# Patient Record
Sex: FEMALE | Race: WHITE | NOT HISPANIC OR LATINO | Employment: FULL TIME | URBAN - METROPOLITAN AREA
[De-identification: names, ages, dates, MRNs, and addresses within clinical notes are randomized per-mention and may not be internally consistent; named-entity substitution may affect disease eponyms.]

---

## 2024-08-24 ENCOUNTER — OFFICE VISIT (OUTPATIENT)
Dept: URGENT CARE | Facility: CLINIC | Age: 29
End: 2024-08-24
Payer: COMMERCIAL

## 2024-08-24 VITALS
WEIGHT: 177 LBS | BODY MASS INDEX: 33.42 KG/M2 | SYSTOLIC BLOOD PRESSURE: 149 MMHG | RESPIRATION RATE: 20 BRPM | DIASTOLIC BLOOD PRESSURE: 93 MMHG | OXYGEN SATURATION: 99 % | TEMPERATURE: 99 F | HEIGHT: 61 IN

## 2024-08-24 DIAGNOSIS — L25.5 PLANT DERMATITIS: Primary | ICD-10-CM

## 2024-08-24 PROCEDURE — 99203 OFFICE O/P NEW LOW 30 MIN: CPT | Mod: ,,, | Performed by: FAMILY MEDICINE

## 2024-08-24 RX ORDER — PREDNISONE 10 MG/1
30 TABLET ORAL DAILY
Qty: 15 TABLET | Refills: 0 | Status: SHIPPED | OUTPATIENT
Start: 2024-08-24 | End: 2024-08-29

## 2024-08-24 RX ORDER — CLOBETASOL PROPIONATE 0.5 MG/G
CREAM TOPICAL 2 TIMES DAILY
Qty: 30 G | Refills: 0 | Status: SHIPPED | OUTPATIENT
Start: 2024-08-24 | End: 2024-08-31

## 2024-08-24 NOTE — PATIENT INSTRUCTIONS
Continue: Travatan Z (travoprost): drops: 0.004% 1 drop at bedtime as directed into both eyes 12- Medication sent to pharmacy take as prescribed.   Use a cold wet washcloth to affected areas to reduce itching   Take either warm or cool bath with oatmeal products such as Aveeno for skin soothing   Avoid direct sunlight   Leave the rash open to air as much as possible   Wash all clothing that may have come in contact with the plan oil   Calamine lotion may help relieve symptoms of the rash and itch   Call or seek immediate medical attention if your rash gets worse, fever, stiff neck, nausea or vomiting, if you have increased pain, swelling, warmth, red streaks, pus draining from the rash, or if you have any new or worrisome symptoms that arise at home or if you do not get better as expected.

## 2024-08-24 NOTE — PROGRESS NOTES
"Subjective:      Patient ID: Emily Nicole Fromenthal is a 28 y.o. female.    Vitals:  height is 5' 1.42" (1.56 m) and weight is 80.3 kg (177 lb). Her oral temperature is 98.7 °F (37.1 °C). Her blood pressure is 149/93 (abnormal). Her respiration is 20 and oxygen saturation is 99%.     Chief Complaint: Rash    Patient is a 28 y.o. female who presents to urgent care with complaints of rash, itching  (small blisters , slow to heal x1.5 months. Located on both ankles and both arms. Alleviating factors include Eucerin location with mild amount of relief.  She is a  and says she may have come into contact with plants.  Denies fever malaise, drainage, or pain.           Constitution: Negative for chills, sweating, fatigue and fever.   HENT: Negative.     Neck: neck negative.   Cardiovascular: Negative.    Eyes: Negative.    Respiratory: Negative.     Gastrointestinal: Negative.    Endocrine: negative.   Genitourinary: Negative.    Musculoskeletal: Negative.    Skin:  Positive for rash. Negative for erythema.   Allergic/Immunologic: Negative.       Objective:     Physical Exam   Constitutional: She is oriented to person, place, and time. She appears well-developed.   HENT:   Head: Normocephalic and atraumatic. Head is without abrasion, without contusion and without laceration.   Ears:   Right Ear: External ear normal.   Left Ear: External ear normal.   Nose: Nose normal.   Mouth/Throat: Oropharynx is clear and moist and mucous membranes are normal.   Eyes: Conjunctivae, EOM and lids are normal. Pupils are equal, round, and reactive to light.   Neck: Trachea normal and phonation normal. Neck supple.   Cardiovascular: Normal rate, regular rhythm and normal heart sounds.   Pulmonary/Chest: Effort normal and breath sounds normal. No stridor. No respiratory distress.   Musculoskeletal: Normal range of motion.         General: Normal range of motion.   Neurological: She is alert and oriented to person, place, and time. " "  Skin: Skin is warm, dry, intact and rash. Capillary refill takes less than 2 seconds. No abrasion, No burn, No bruising, No erythema and No ecchymosis         Comments: Multiple small round scabs and vesicles in multiple stages of healing to the right and left forearms   Psychiatric: Her speech is normal and behavior is normal. Judgment and thought content normal.   Nursing note and vitals reviewed.         Previous History      Review of patient's allergies indicates:  No Known Allergies    Past Medical History:   Diagnosis Date    PCOS (polycystic ovarian syndrome)     Scoliosis/kyphoscoliosis      Current Outpatient Medications   Medication Instructions    clobetasoL (TEMOVATE) 0.05 % cream Topical (Top), 2 times daily    diclofenac (VOLTAREN) 50 mg, Every 8 hours PRN    FLUoxetine 10 MG capsule TAKE 1 CAPSULE BY MOUTH ONCE DAILY WITH 20 MG    predniSONE (DELTASONE) 30 mg, Oral, Daily    propranoloL (INDERAL) 10 MG tablet TAKE 1 TO 2 TABLETS BY MOUTH ONCE DAILY AS NEEDED FOR ANXIETY    TRI-SPRINTEC, 28, 0.18/0.215/0.25 mg-35 mcg (28) tablet 1 tablet, Oral, Daily     Past Surgical History:   Procedure Laterality Date    WISDOM TOOTH EXTRACTION Bilateral      Family History   Problem Relation Name Age of Onset    Hypertension Maternal Grandmother      Hypertension Maternal Grandfather      Hypertension Paternal Grandmother      Coronary artery disease Paternal Grandmother         Social History     Tobacco Use    Smoking status: Never    Smokeless tobacco: Never   Substance Use Topics    Alcohol use: Not Currently    Drug use: Never        Physical Exam      Vital Signs Reviewed   BP (!) 149/93 (BP Location: Left arm)   Temp 98.7 °F (37.1 °C) (Oral)   Resp 20   Ht 5' 1.42" (1.56 m)   Wt 80.3 kg (177 lb)   LMP 08/03/2024 (Within Weeks)   SpO2 99%   BMI 32.99 kg/m²        Procedures    Procedures     Labs   No results found for this or any previous visit.   Assessment:     1. Plant dermatitis        Plan: "   Medication sent to pharmacy take as prescribed.   Use a cold wet washcloth to affected areas to reduce itching   Take either warm or cool bath with oatmeal products such as Aveeno for skin soothing   Avoid direct sunlight   Leave the rash open to air as much as possible   Wash all clothing that may have come in contact with the plan oil   Calamine lotion may help relieve symptoms of the rash and itch   Call or seek immediate medical attention if your rash gets worse, fever, stiff neck, nausea or vomiting, if you have increased pain, swelling, warmth, red streaks, pus draining from the rash, or if you have any new or worrisome symptoms that arise at home or if you do not get better as expected.     Plant dermatitis    Other orders  -     predniSONE (DELTASONE) 10 MG tablet; Take 3 tablets (30 mg total) by mouth once daily. for 5 days  Dispense: 15 tablet; Refill: 0  -     clobetasoL (TEMOVATE) 0.05 % cream; Apply topically 2 (two) times daily. for 7 days  Dispense: 30 g; Refill: 0

## 2024-09-19 ENCOUNTER — LAB REQUISITION (OUTPATIENT)
Dept: LAB | Facility: HOSPITAL | Age: 29
End: 2024-09-19
Payer: COMMERCIAL

## 2024-09-19 DIAGNOSIS — L30.9 DERMATITIS, UNSPECIFIED: ICD-10-CM

## 2024-09-19 PROCEDURE — 87070 CULTURE OTHR SPECIMN AEROBIC: CPT | Performed by: PHYSICIAN ASSISTANT

## 2024-09-22 LAB — BACTERIA WND CULT: ABNORMAL

## 2024-12-04 ENCOUNTER — LAB REQUISITION (OUTPATIENT)
Dept: LAB | Facility: HOSPITAL | Age: 29
End: 2024-12-04
Payer: COMMERCIAL

## 2024-12-04 DIAGNOSIS — L01.01 NON-BULLOUS IMPETIGO: ICD-10-CM

## 2024-12-04 PROCEDURE — 87070 CULTURE OTHR SPECIMN AEROBIC: CPT | Performed by: DERMATOLOGY

## 2024-12-07 LAB — BACTERIA WND CULT: NORMAL
